# Patient Record
Sex: MALE | Race: WHITE | NOT HISPANIC OR LATINO | Employment: FULL TIME | URBAN - METROPOLITAN AREA
[De-identification: names, ages, dates, MRNs, and addresses within clinical notes are randomized per-mention and may not be internally consistent; named-entity substitution may affect disease eponyms.]

---

## 2017-02-23 ENCOUNTER — OFFICE VISIT (OUTPATIENT)
Dept: INTERNAL MEDICINE | Facility: CLINIC | Age: 22
End: 2017-02-23

## 2017-02-23 VITALS
HEART RATE: 72 BPM | BODY MASS INDEX: 31.52 KG/M2 | DIASTOLIC BLOOD PRESSURE: 80 MMHG | SYSTOLIC BLOOD PRESSURE: 120 MMHG | HEIGHT: 67 IN | WEIGHT: 200.8 LBS

## 2017-02-23 DIAGNOSIS — J02.9 ACUTE PHARYNGITIS, UNSPECIFIED ETIOLOGY: Primary | ICD-10-CM

## 2017-02-23 PROCEDURE — 99212 OFFICE O/P EST SF 10 MIN: CPT | Performed by: INTERNAL MEDICINE

## 2017-02-23 RX ORDER — AZITHROMYCIN 250 MG/1
TABLET, FILM COATED ORAL
Qty: 6 TABLET | Refills: 0 | Status: SHIPPED | OUTPATIENT
Start: 2017-02-23 | End: 2017-09-13

## 2017-02-23 NOTE — PROGRESS NOTES
Subjective   Scotty Atwood Jr. is a 21 y.o. male.     History of Present Illness he relates a 3 day history of sore throat.  She denies any rhinorrhea, fever, cough, nausea or vomiting, or diarrhea.  He has had strep throat 3 times in the past.  Unfortunately he has sensitivity to amoxicillin.        Review of Systems   Constitutional: Negative for activity change, appetite change, chills, diaphoresis, fatigue and fever.   Respiratory: Negative for cough, chest tightness, shortness of breath and wheezing.    Cardiovascular: Negative for chest pain.   Gastrointestinal: Negative for abdominal pain, diarrhea, nausea and vomiting.   Musculoskeletal: Negative for arthralgias and myalgias.   Neurological: Negative for dizziness, weakness and headaches.       Objective   Physical Exam   Constitutional: He is oriented to person, place, and time. Vital signs are normal. He appears well-developed and well-nourished. He is active. He does not appear ill.   HENT:   Right Ear: Tympanic membrane, external ear and ear canal normal.   Left Ear: Tympanic membrane, external ear and ear canal normal.   Mouth/Throat: Posterior oropharyngeal erythema present. No oropharyngeal exudate or posterior oropharyngeal edema.   Eyes: Conjunctivae are normal.   Cardiovascular: Normal rate, regular rhythm, S1 normal and S2 normal.  Exam reveals no S3 and no S4.    No murmur heard.  Pulmonary/Chest: No tachypnea. No respiratory distress. He has no decreased breath sounds. He has no wheezes. He has no rhonchi. He has no rales.   Abdominal: Soft. Normal appearance and bowel sounds are normal. He exhibits no abdominal bruit and no mass. There is no hepatosplenomegaly. There is no tenderness.   Lymphadenopathy:     He has no cervical adenopathy.   Neurological: He is alert and oriented to person, place, and time. Gait normal.   Psychiatric: He has a normal mood and affect. His speech is normal and behavior is normal. Judgment and thought content normal.  Cognition and memory are normal.       Assessment/Plan  assessment #1 acute pharyngitis-question strep and certainly he has a past history.    Plan #1 Z-Jeronimo 5 day empirically pending strep culture results.

## 2017-07-19 ENCOUNTER — APPOINTMENT (OUTPATIENT)
Dept: GENERAL RADIOLOGY | Facility: HOSPITAL | Age: 22
End: 2017-07-19

## 2017-07-19 ENCOUNTER — HOSPITAL ENCOUNTER (EMERGENCY)
Facility: HOSPITAL | Age: 22
Discharge: HOME | End: 2017-07-19
Attending: EMERGENCY MEDICINE | Admitting: EMERGENCY MEDICINE

## 2017-07-19 VITALS
WEIGHT: 195 LBS | RESPIRATION RATE: 16 BRPM | HEART RATE: 91 BPM | DIASTOLIC BLOOD PRESSURE: 79 MMHG | HEIGHT: 67 IN | BODY MASS INDEX: 30.61 KG/M2 | SYSTOLIC BLOOD PRESSURE: 128 MMHG | OXYGEN SATURATION: 97 % | TEMPERATURE: 97.8 F
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
## 2017-07-19 DIAGNOSIS — S16.1XXA CERVICAL STRAIN, INITIAL ENCOUNTER: ICD-10-CM

## 2017-07-19 DIAGNOSIS — S39.012A LUMBAR STRAIN, INITIAL ENCOUNTER: ICD-10-CM

## 2017-07-19 PROCEDURE — 99283 EMERGENCY DEPT VISIT LOW MDM: CPT

## 2017-07-19 PROCEDURE — 72110 X-RAY EXAM L-2 SPINE 4/>VWS: CPT

## 2017-07-19 PROCEDURE — 72050 X-RAY EXAM NECK SPINE 4/5VWS: CPT

## 2017-07-19 RX ORDER — CYCLOBENZAPRINE HCL 10 MG
10 TABLET ORAL 3 TIMES DAILY PRN
Qty: 20 TABLET | Refills: 0 | Status: SHIPPED | OUTPATIENT
Start: 2017-07-19 | End: 2017-09-13

## 2017-07-19 RX ORDER — DICLOFENAC SODIUM 75 MG/1
75 TABLET, DELAYED RELEASE ORAL 2 TIMES DAILY PRN
Qty: 20 TABLET | Refills: 0 | Status: SHIPPED | OUTPATIENT
Start: 2017-07-19 | End: 2017-09-13
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
## 2017-07-20 ENCOUNTER — TELEPHONE (OUTPATIENT)
Dept: SOCIAL WORK | Facility: HOSPITAL | Age: 22
End: 2017-07-20
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
## 2017-09-13 ENCOUNTER — OFFICE VISIT (OUTPATIENT)
Dept: INTERNAL MEDICINE | Facility: CLINIC | Age: 22
End: 2017-09-13

## 2017-09-13 VITALS
HEART RATE: 84 BPM | BODY MASS INDEX: 30.92 KG/M2 | DIASTOLIC BLOOD PRESSURE: 78 MMHG | SYSTOLIC BLOOD PRESSURE: 120 MMHG | HEIGHT: 67 IN | WEIGHT: 197 LBS

## 2017-09-13 DIAGNOSIS — G47.33 OBSTRUCTIVE SLEEP APNEA SYNDROME: ICD-10-CM

## 2017-09-13 DIAGNOSIS — E01.0 THYROMEGALY: Primary | ICD-10-CM

## 2017-09-13 DIAGNOSIS — Z00.00 WELLNESS EXAMINATION: ICD-10-CM

## 2017-09-13 PROBLEM — J02.9 ACUTE PHARYNGITIS: Status: RESOLVED | Noted: 2017-02-23 | Resolved: 2017-09-13

## 2017-09-13 PROBLEM — G47.30 SLEEP APNEA: Status: ACTIVE | Noted: 2017-09-13

## 2017-09-13 PROCEDURE — 99395 PREV VISIT EST AGE 18-39: CPT | Performed by: INTERNAL MEDICINE

## 2017-09-13 NOTE — PROGRESS NOTES
Subjective   Scotty Atwood Jr. is a 22 y.o. male.     History of Present Illness he is here today for physical exam which includes evaluation and treatment of snoring and daytime hypersomnolence as well as 20 pound weight gain over the last year.  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  He relates a 20 pound weight gain over the last year.  He does have significant snoring as well as daytime hypersomnolence noted for at least a few years.  He denies any dizziness or headache and he has not had any syncopal episodes.  He denies any PND, BRIONES, chest pain, wheezing, or cough.  His bowel habit is stable.  He denies any constipation or diarrhea.  In fact his review systems is otherwise negative.        Review of Systems   Constitutional: Positive for unexpected weight change. Negative for activity change, appetite change and fatigue.   HENT: Negative for trouble swallowing.    Respiratory: Negative for cough, chest tightness, shortness of breath and wheezing.    Cardiovascular: Negative for chest pain, palpitations and leg swelling.   Gastrointestinal: Negative for abdominal pain, anal bleeding, blood in stool, constipation, diarrhea, nausea and vomiting.   Genitourinary: Negative for difficulty urinating, dysuria, flank pain, frequency and hematuria.   Musculoskeletal: Negative for arthralgias and back pain.   Neurological: Negative for dizziness, syncope, facial asymmetry, speech difficulty, weakness, numbness and headaches.   Psychiatric/Behavioral: Negative for confusion and dysphoric mood. The patient is not nervous/anxious.        Objective   Physical Exam   Constitutional: He is oriented to person, place, and time. Vital signs are normal. He appears well-developed and well-nourished. He is active. He does not appear ill.   HENT:   Mouth/Throat: No oral lesions.   Eyes: Conjunctivae are normal.   Fundoscopic exam:       The right eye shows no AV nicking, no exudate  and no hemorrhage.        The left eye shows no AV nicking, no exudate and no hemorrhage.   Neck: Carotid bruit is not present. Thyromegaly present. No thyroid mass present.       Cardiovascular: Normal rate, regular rhythm, S1 normal and S2 normal.  Exam reveals no S3 and no S4.    No murmur heard.  Pulmonary/Chest: No tachypnea. No respiratory distress. He has no decreased breath sounds. He has no wheezes. He has no rhonchi. He has no rales.   Abdominal: Soft. Normal appearance and bowel sounds are normal. He exhibits no abdominal bruit and no mass. There is no hepatosplenomegaly. There is no tenderness.       Vascular Status -  His exam exhibits no right foot edema. His exam exhibits no left foot edema.  Lymphadenopathy:     He has no cervical adenopathy.     He has no axillary adenopathy.   Neurological: He is alert and oriented to person, place, and time. He displays no tremor. Gait normal.   Reflex Scores:       Bicep reflexes are 2+ on the right side.  Psychiatric: He has a normal mood and affect. His speech is normal and behavior is normal. Judgment and thought content normal. Cognition and memory are normal.       Assessment/Plan assessment #1 snoring and daytime hypersomnolence-almost assuredly obstructive sleep apnea and this was discussed with the patient.  #2 thyromegaly-euthyroid clinically    Plan #1 pulmonary consult for sleep study #2 thyroid ultrasound #3 CBC, CMP, urinalysis, TSH, lipids.  Routine follow-up with me in 6 months.

## 2017-09-21 ENCOUNTER — APPOINTMENT (OUTPATIENT)
Dept: ULTRASOUND IMAGING | Facility: HOSPITAL | Age: 22
End: 2017-09-21
Attending: INTERNAL MEDICINE

## 2017-09-25 ENCOUNTER — LAB (OUTPATIENT)
Dept: INTERNAL MEDICINE | Facility: CLINIC | Age: 22
End: 2017-09-25

## 2017-09-25 DIAGNOSIS — E01.0 THYROMEGALY: ICD-10-CM

## 2017-09-25 LAB
ALBUMIN SERPL-MCNC: 4.2 G/DL (ref 3.5–5.2)
ALBUMIN/GLOB SERPL: 1.2 G/DL
ALP SERPL-CCNC: 85 U/L (ref 39–117)
ALT SERPL W P-5'-P-CCNC: 24 U/L (ref 1–41)
ANION GAP SERPL CALCULATED.3IONS-SCNC: 9.7 MMOL/L
AST SERPL-CCNC: 23 U/L (ref 1–40)
BASOPHILS # BLD AUTO: 0.03 10*3/MM3 (ref 0–0.2)
BASOPHILS NFR BLD AUTO: 0.4 % (ref 0–2)
BILIRUB SERPL-MCNC: 0.5 MG/DL (ref 0.1–1.2)
BILIRUB UR QL STRIP: NEGATIVE
BUN BLD-MCNC: 12 MG/DL (ref 6–20)
BUN/CREAT SERPL: 12.1 (ref 7–25)
CALCIUM SPEC-SCNC: 10.2 MG/DL (ref 8.6–10.5)
CHLORIDE SERPL-SCNC: 99 MMOL/L (ref 98–107)
CHOLEST SERPL-MCNC: 225 MG/DL (ref 0–200)
CLARITY UR: CLEAR
CO2 SERPL-SCNC: 31.3 MMOL/L (ref 22–29)
COLOR UR: YELLOW
CREAT BLD-MCNC: 0.99 MG/DL (ref 0.76–1.27)
DEPRECATED RDW RBC AUTO: 39.6 FL (ref 37–54)
EOSINOPHIL # BLD AUTO: 0.1 10*3/MM3 (ref 0–0.7)
EOSINOPHIL NFR BLD AUTO: 1.3 % (ref 0–5)
ERYTHROCYTE [DISTWIDTH] IN BLOOD BY AUTOMATED COUNT: 12.5 % (ref 11.5–15)
GFR SERPL CREATININE-BSD FRML MDRD: 95 ML/MIN/1.73
GLOBULIN UR ELPH-MCNC: 3.5 GM/DL
GLUCOSE BLD-MCNC: 100 MG/DL (ref 65–99)
GLUCOSE UR STRIP-MCNC: NEGATIVE MG/DL
HCT VFR BLD AUTO: 45.7 % (ref 40.1–51)
HDLC SERPL-MCNC: 37 MG/DL (ref 40–60)
HGB BLD-MCNC: 15.6 G/DL (ref 13.7–17.5)
HGB UR QL STRIP.AUTO: NEGATIVE
KETONES UR QL STRIP: NEGATIVE
LDLC SERPL CALC-MCNC: 143 MG/DL (ref 0–100)
LDLC/HDLC SERPL: 3.88 {RATIO}
LEUKOCYTE ESTERASE UR QL STRIP.AUTO: NEGATIVE
LYMPHOCYTES # BLD AUTO: 1.95 10*3/MM3 (ref 0.8–7)
LYMPHOCYTES NFR BLD AUTO: 24.7 % (ref 10–60)
MCH RBC QN AUTO: 29.8 PG (ref 26–34)
MCHC RBC AUTO-ENTMCNC: 34.1 G/DL (ref 31–37)
MCV RBC AUTO: 87.4 FL (ref 80–100)
MONOCYTES # BLD AUTO: 0.56 10*3/MM3 (ref 0–1)
MONOCYTES NFR BLD AUTO: 7.1 % (ref 0–13)
NEUTROPHILS # BLD AUTO: 5.24 10*3/MM3 (ref 1–11)
NEUTROPHILS NFR BLD AUTO: 66.5 % (ref 30–85)
NITRITE UR QL STRIP: NEGATIVE
PH UR STRIP.AUTO: 7.5 [PH] (ref 5–8)
PLATELET # BLD AUTO: 255 10*3/MM3 (ref 150–450)
PMV BLD AUTO: 9.6 FL (ref 6–12)
POTASSIUM BLD-SCNC: 4.9 MMOL/L (ref 3.5–5.2)
PROT SERPL-MCNC: 7.7 G/DL (ref 6–8.5)
PROT UR QL STRIP: NEGATIVE
RBC # BLD AUTO: 5.23 10*6/MM3 (ref 4.63–6.08)
SODIUM BLD-SCNC: 140 MMOL/L (ref 136–145)
SP GR UR STRIP: 1.01 (ref 1–1.03)
TRIGL SERPL-MCNC: 223 MG/DL (ref 0–150)
TSH SERPL DL<=0.05 MIU/L-ACNC: 2.36 MIU/ML (ref 0.27–4.2)
UROBILINOGEN UR QL STRIP: NORMAL
VLDLC SERPL-MCNC: 44.6 MG/DL (ref 5–40)
WBC NRBC COR # BLD: 7.88 10*3/MM3 (ref 5–10)

## 2017-09-25 PROCEDURE — 85025 COMPLETE CBC W/AUTO DIFF WBC: CPT | Performed by: INTERNAL MEDICINE

## 2017-09-25 PROCEDURE — 81003 URINALYSIS AUTO W/O SCOPE: CPT | Performed by: INTERNAL MEDICINE

## 2017-09-25 PROCEDURE — 80053 COMPREHEN METABOLIC PANEL: CPT | Performed by: INTERNAL MEDICINE

## 2017-09-25 PROCEDURE — 84443 ASSAY THYROID STIM HORMONE: CPT | Performed by: INTERNAL MEDICINE

## 2017-09-25 PROCEDURE — 80061 LIPID PANEL: CPT | Performed by: INTERNAL MEDICINE

## 2017-09-25 PROCEDURE — 36415 COLL VENOUS BLD VENIPUNCTURE: CPT | Performed by: INTERNAL MEDICINE

## 2017-09-28 ENCOUNTER — HOSPITAL ENCOUNTER (OUTPATIENT)
Dept: ULTRASOUND IMAGING | Facility: HOSPITAL | Age: 22
Discharge: HOME | End: 2017-09-28
Attending: INTERNAL MEDICINE | Admitting: INTERNAL MEDICINE

## 2017-09-28 DIAGNOSIS — E01.0 THYROMEGALY: ICD-10-CM

## 2017-09-28 PROCEDURE — 76536 US EXAM OF HEAD AND NECK: CPT

## 2018-03-16 ENCOUNTER — OFFICE VISIT (OUTPATIENT)
Dept: INTERNAL MEDICINE | Facility: CLINIC | Age: 23
End: 2018-03-16

## 2018-03-16 VITALS
BODY MASS INDEX: 31.86 KG/M2 | SYSTOLIC BLOOD PRESSURE: 138 MMHG | WEIGHT: 203 LBS | HEIGHT: 67 IN | RESPIRATION RATE: 14 BRPM | DIASTOLIC BLOOD PRESSURE: 84 MMHG

## 2018-03-16 DIAGNOSIS — E04.2 MULTINODULAR GOITER: ICD-10-CM

## 2018-03-16 DIAGNOSIS — G47.33 OBSTRUCTIVE SLEEP APNEA SYNDROME: Primary | ICD-10-CM

## 2018-03-16 PROBLEM — Z00.00 WELLNESS EXAMINATION: Status: RESOLVED | Noted: 2017-09-13 | Resolved: 2018-03-16

## 2018-03-16 PROBLEM — E01.0 THYROMEGALY: Status: RESOLVED | Noted: 2017-09-13 | Resolved: 2018-03-16

## 2018-03-16 PROCEDURE — 99213 OFFICE O/P EST LOW 20 MIN: CPT | Performed by: INTERNAL MEDICINE

## 2018-03-16 NOTE — PROGRESS NOTES
Subjective   Scotty Atwood Jr. is a 22 y.o. male.     History of Present Illness he is here today for follow-up of hypercholesterolemia, presumed obstructive sleep apnea, and multinodular goiter.  He was told that his in home sleep study was normal but that the symptoms were still suggestive of obstructive sleep apnea and that he would need an in-hospital study.  That has not been done yet.  Regarding hypercholesterolemia his diet is not high in cholesterol.  Family history is negative for coronary artery disease.  He does not smoke.  Regarding his thyroid there has been no tenderness and he denies any dysphagia.  He does have fatigue.        Review of Systems   Constitutional: Positive for fatigue. Negative for activity change and appetite change.   Respiratory: Negative for cough, chest tightness, shortness of breath and wheezing.    Cardiovascular: Negative for chest pain, palpitations and leg swelling.   Gastrointestinal: Negative for abdominal pain.   Genitourinary: Negative for dysuria, flank pain, frequency and hematuria.   Neurological: Negative for dizziness and headaches.       Objective   Physical Exam   Constitutional: He is oriented to person, place, and time. Vital signs are normal. He appears well-developed and well-nourished. He is active. He does not appear ill.   Eyes: Conjunctivae are normal.   Neck: Thyromegaly present. No thyroid mass present.       Cardiovascular: Normal rate, regular rhythm, S1 normal and S2 normal.  Exam reveals no S3 and no S4.    No murmur heard.  Pulmonary/Chest: No tachypnea. No respiratory distress. He has no decreased breath sounds. He has no wheezes. He has no rhonchi. He has no rales.   Abdominal: Soft. Normal appearance and bowel sounds are normal. He exhibits no abdominal bruit and no mass. There is no hepatosplenomegaly. There is no tenderness.     Vascular Status -  His right foot exhibits normal right foot edema. His left foot exhibits normal left foot  edema.  Neurological: He is alert and oriented to person, place, and time. Gait normal.   Psychiatric: He has a normal mood and affect. His speech is normal and behavior is normal. Judgment and thought content normal. Cognition and memory are normal.         Assessment/Plan temper lab showed normal CBC, CMP, urinalysis, TSH.  Total cholesterol 225 triglycerides 283 HDL cholesterol 37 LDL cholesterol 143.  Thyroid ultrasound showed a multinodular gland without dominant nodules.    Assessment #1 suspect obstructive sleep [id number] with pulmonary necessary and this was discussed with him.  #2 multinodular goiter-euthyroid at present #3 mild hypercholesterolemia-at this point long-term statin therapy is not indicated and this was discussed with him as well.    Plan #1 schedule pulmonary follow-up.  Routine follow-up in 6 months with repeat thyroid ultrasound at that time.

## 2018-04-04 ENCOUNTER — APPOINTMENT (OUTPATIENT)
Dept: SLEEP MEDICINE | Facility: HOSPITAL | Age: 23
End: 2018-04-04

## 2018-04-05 ENCOUNTER — APPOINTMENT (OUTPATIENT)
Dept: SLEEP MEDICINE | Facility: HOSPITAL | Age: 23
End: 2018-04-05

## 2019-09-14 ENCOUNTER — HOSPITAL ENCOUNTER (EMERGENCY)
Facility: HOSPITAL | Age: 24
Discharge: HOME | End: 2019-09-14
Attending: EMERGENCY MEDICINE | Admitting: EMERGENCY MEDICINE

## 2019-09-14 VITALS
SYSTOLIC BLOOD PRESSURE: 109 MMHG | OXYGEN SATURATION: 97 % | DIASTOLIC BLOOD PRESSURE: 80 MMHG | HEIGHT: 67 IN | BODY MASS INDEX: 31.39 KG/M2 | WEIGHT: 200 LBS | TEMPERATURE: 98.8 F | HEART RATE: 78 BPM | RESPIRATION RATE: 16 BRPM

## 2019-09-14 DIAGNOSIS — S61.210A LACERATION OF RIGHT INDEX FINGER WITHOUT FOREIGN BODY WITHOUT DAMAGE TO NAIL, INITIAL ENCOUNTER: Primary | ICD-10-CM

## 2019-09-14 PROCEDURE — 99282 EMERGENCY DEPT VISIT SF MDM: CPT

## 2019-09-14 RX ORDER — LIDOCAINE HYDROCHLORIDE AND EPINEPHRINE 10; 10 MG/ML; UG/ML
10 INJECTION, SOLUTION INFILTRATION; PERINEURAL ONCE
Status: COMPLETED | OUTPATIENT
Start: 2019-09-14 | End: 2019-09-14

## 2019-09-14 RX ADMIN — LIDOCAINE HYDROCHLORIDE AND EPINEPHRINE 10 ML: 10; 10 INJECTION, SOLUTION INFILTRATION; PERINEURAL at 14:25

## 2019-09-14 NOTE — DISCHARGE INSTRUCTIONS
1.  Keep initial dressing in place for 24 hours if able.  (if blood seeps through, then remove this dressing, wash gently with antibacterial soap and pat dry)    2.  After initial 24 hours wash the wound twice a day with antibacterial soap.  Sutures will dissolve on their own.    3.  Cover with bandaid while at work    Return Precautions    Although you are being discharged from the ED today, I encourage you to return for worsening symptoms.  Things can, and do, change such that treatment at home with medication may not be adequate.      Specifically, return for any of the following:    Chest pain, shortness of breath, pain or nausea and vomiting not controlled by medications provided.    Please make a follow up with your Primary Care Provider for a blood pressure recheck.

## 2019-09-14 NOTE — ED PROVIDER NOTES
EMERGENCY DEPARTMENT ENCOUNTER    Room Number:  06/06  Date seen:  9/14/2019  Time seen: 12:42 PM  PCP: Marko Xie Jr., MD (Inactive)    HPI:  Chief complaint: finger laceration  Context:Scotty Atwood Jr. is a 24 y.o. male who presents to the ED with c/o R index finger laceration, occurring after pt cut his hand on a broken glass cup while washing the dishes this morning, half an hour ago. Bleeding has been stopped w/ direct pressure and tissue over the wound. No other complaints at this time. Up to date on Tetanus shot.    ALLERGIES  Amoxicillin    PAST MEDICAL HISTORY  Active Ambulatory Problems     Diagnosis Date Noted   • Environmental allergies 05/11/2016   • Mild intermittent asthma without complication 05/11/2016   • Sleep apnea 09/13/2017   • Multinodular goiter 03/16/2018     Resolved Ambulatory Problems     Diagnosis Date Noted   • Onychomycosis 05/11/2016   • Acute pharyngitis 02/23/2017   • Thyromegaly 09/13/2017   • Wellness examination 09/13/2017     Past Medical History:   Diagnosis Date   • Seasonal allergies        PAST SURGICAL HISTORY  Past Surgical History:   Procedure Laterality Date   • APPENDECTOMY         FAMILY HISTORY  Family History   Problem Relation Age of Onset   • Diabetes Father    • Cancer Paternal Grandmother        SOCIAL HISTORY  Social History     Socioeconomic History   • Marital status: Single     Spouse name: Not on file   • Number of children: Not on file   • Years of education: Not on file   • Highest education level: Not on file   Tobacco Use   • Smoking status: Never Smoker   Substance and Sexual Activity   • Alcohol use: No   • Drug use: No   • Sexual activity: Defer       REVIEW OF SYSTEMS  Review of Systems   Constitutional: Negative for activity change, appetite change, diaphoresis and fever.   HENT: Negative for trouble swallowing.    Eyes: Negative for visual disturbance.   Respiratory: Negative for cough, chest tightness, shortness of breath and wheezing.     Cardiovascular: Negative for chest pain, palpitations and leg swelling.   Gastrointestinal: Negative for abdominal pain, diarrhea, nausea and vomiting.   Genitourinary: Negative for dysuria.   Musculoskeletal: Negative for back pain.   Skin: Positive for wound (+) RIF laceration. Negative for rash.   Neurological: Negative for dizziness, speech difficulty and light-headedness.       PHYSICAL EXAM  ED Triage Vitals [09/14/19 1231]   Temp Heart Rate Resp BP SpO2   98.8 °F (37.1 °C) 85 17 -- 98 %      Temp src Heart Rate Source Patient Position BP Location FiO2 (%)   Tympanic -- -- -- --     Physical Exam   Constitutional: He is oriented to person, place, and time and well-developed, well-nourished, and in no distress. No distress.   HENT:   Head: Normocephalic and atraumatic.   Eyes: Pupils are equal, round, and reactive to light.   Neck: Normal range of motion. Neck supple.   Cardiovascular: Normal rate, regular rhythm, S1 normal, S2 normal and normal heart sounds. Exam reveals no gallop and no friction rub.   No murmur heard.  Pulmonary/Chest: Effort normal and breath sounds normal. No respiratory distress. He has no decreased breath sounds. He has no wheezes. He has no rales. He exhibits no tenderness.   Abdominal: Soft.   Musculoskeletal: Normal range of motion. He exhibits no deformity.        Hands:  Lymphadenopathy:     He has no cervical adenopathy.   Neurological: He is alert and oriented to person, place, and time.   Skin: Skin is warm and dry.   2 cm Z shaped laceration to RIF   Psychiatric: Affect normal.   Nursing note and vitals reviewed.    MEDICATIONS GIVEN IN ER  Medications   lidocaine-EPINEPHrine (XYLOCAINE W/EPI) 1 %-1:[id number] injection 10 mL (10 mL Injection Given by Other 9/14/19 1425)       PROCEDURES  Laceration Repair  Date/Time: 9/14/2019 1:40 PM  Performed by: Radha Flores APRN  Authorized by: Adams Delgado MD     Consent:     Consent obtained:  Verbal    Consent given by:   Patient    Risks discussed:  Infection, need for additional repair and pain  Anesthesia (see MAR for exact dosages):     Anesthesia method:  Local infiltration    Local anesthetic:  Lidocaine 1% WITH epi  Laceration details:     Location:  Finger    Finger location:  R index finger    Length (cm):  2  Repair type:     Repair type:  Simple  Exploration:     Hemostasis achieved with:  Direct pressure  Treatment:     Area cleansed with:  Mayco    Amount of cleaning:  Extensive    Irrigation solution:  Sterile saline    Irrigation method:  Pressure wash  Skin repair:     Repair method:  Sutures    Suture size:  5-0    Suture material:  Chromic gut    Suture technique:  Simple interrupted    Number of sutures:  9  Approximation:     Approximation:  Close  Post-procedure details:     Dressing:  Splint for protection    Patient tolerance of procedure:  Tolerated well, no immediate complications                COURSE & MEDICAL DECISION MAKING  Pertinent Labs and Imaging studies that were ordered and reviewed are noted above.  Results were reviewed/discussed with the patient and they were also made aware of online access.  Pt also made aware that some labs, such as cultures, will not be resulted during ER visit and follow up with PMD is necessary.     PROGRESS AND CONSULTS    Progress Notes:     1256- Discussed plan to repair laceration. Pt understands and agrees with the plan, all questions answered.    1315- Reviewed pt's history and workup with Dr. Delgado.  After a bedside evaluation, Dr. Delgado agrees with the plan of care.    1341- Reentered pt's room to repair laceration. /90. Pt in NAD. Discussed the plan to discharge pt home, w/ instructions to f/u w/ PCP for follow up. Pt understands and agrees with the plan, all questions answered.    The patient's history, physical exam, and lab findings were discussed with the physician, who also performed a face to face history and physical exam.  I discussed all  "results and noted any abnormalities with patient.  Discussed absoute need to recheck abnormalities with their family physician.  I answered any of the patient's questions.  Discussed plan for discharge, as there is no emergent indication for admission.  Pt is agreeable and understands need for follow up and repeat testing.  Pt is aware that discharge does not mean that nothing is wrong but it indicates no emergency is present and they must continue care with their family physician.  Pt is discharged with instructions to follow up with primary care doctor to have their blood pressure rechecked.       Disposition vitals:  /80   Pulse 78   Temp 98.8 °F (37.1 °C) (Tympanic)   Resp 16   Ht 170.2 cm (67\")   Wt 90.7 kg (200 lb)   SpO2 97%   BMI 31.32 kg/m²       DIAGNOSIS  Final diagnoses:   Laceration of right index finger without foreign body without damage to nail, initial encounter       FOLLOW UP   Anne-MarieMarko padilla Jr., MD  [address] [address]  [phone]    Schedule an appointment as soon as possible for a visit in 1 week  For wound re-check      RX     Medication List      No changes were made to your prescriptions during this visit.       Documentation assistance provided by ovidio Wise for MODESTA Robins.  Information recorded by the ovidio was done at my direction and has been verified and validated by me.     Lia Wise  09/14/19 [id number]       Radha Flores APRN  09/14/19 9480    "

## 2019-09-14 NOTE — ED NOTES
Wound dressed with non-adherent dressing and placed in finger splint.      Sebastian Looney RN  09/14/19 4040

## 2019-09-14 NOTE — ED TRIAGE NOTES
Pt cut hand on glass cup, laceration noted to right index finger, bleeding controlled. Unknown last tetanus shot.

## 2019-09-14 NOTE — ED PROVIDER NOTES
Pt presents to the ED c/o a laceration to his right index finger that occurred while he was washing a glass cup and it shattered as he was cleaning it. Pt does not recall the date of his last tetanus shot, but he believes he is up to date.     On exam,   3 cm laceraton on the dorsum of his rght second finger w/o tendon involvement. Intact flexion and extension at all MCP, PIP, and DIP joints.     Plan: MODESTA Robins, will perform laceration repair and will discharge home.     Attestation:  The YESSI and I have discussed this patient's history, physical exam, and treatment plan.  I have reviewed the documentation and personally had a face to face interaction with the patient. I affirm the documentation and agree with the treatment and plan.  The attached note describes my personal findings.       Documentation assistance provided by ovidio Sanabria for Dr. CHIRAG Delgado MD.  Information recorded by the scribe was done at my direction and has been verified and validated by me.       Ana Paula Sanabria  09/14/19 4647       Adams Delgado MD  09/14/19 3468

## 2019-09-24 ENCOUNTER — OFFICE VISIT (OUTPATIENT)
Dept: INTERNAL MEDICINE | Facility: CLINIC | Age: 24
End: 2019-09-24

## 2019-09-24 VITALS
BODY MASS INDEX: 31.58 KG/M2 | HEIGHT: 68 IN | OXYGEN SATURATION: 98 % | DIASTOLIC BLOOD PRESSURE: 74 MMHG | HEART RATE: 67 BPM | TEMPERATURE: 97.8 F | RESPIRATION RATE: 16 BRPM | WEIGHT: 208.4 LBS | SYSTOLIC BLOOD PRESSURE: 106 MMHG

## 2019-09-24 DIAGNOSIS — J45.20 MILD INTERMITTENT ASTHMA WITHOUT COMPLICATION: Primary | ICD-10-CM

## 2019-09-24 DIAGNOSIS — E04.2 MULTINODULAR GOITER: ICD-10-CM

## 2019-09-24 DIAGNOSIS — B35.1 ONYCHOMYCOSIS: ICD-10-CM

## 2019-09-24 DIAGNOSIS — Z00.00 HEALTHCARE MAINTENANCE: ICD-10-CM

## 2019-09-24 DIAGNOSIS — Z91.09 ENVIRONMENTAL ALLERGIES: ICD-10-CM

## 2019-09-24 PROCEDURE — 99214 OFFICE O/P EST MOD 30 MIN: CPT | Performed by: NURSE PRACTITIONER

## 2019-09-24 RX ORDER — ALBUTEROL SULFATE 90 UG/1
2 AEROSOL, METERED RESPIRATORY (INHALATION) EVERY 4 HOURS PRN
Qty: 1 INHALER | Refills: 2 | Status: SHIPPED | OUTPATIENT
Start: 2019-09-24

## 2019-09-24 RX ORDER — TERBINAFINE HYDROCHLORIDE 250 MG/1
250 TABLET ORAL DAILY
Qty: 45 TABLET | Refills: 0 | Status: SHIPPED | OUTPATIENT
Start: 2019-09-24

## 2019-09-24 NOTE — PROGRESS NOTES
"Subjective   Scotty Atwood Jr. is a 24 y.o. male.   CC: Establish care, foot fungus    Patient presents to establish care.  This is a 24-year-old male former patient of Dr. Xie.  This patient is new to me.    He has a history of multinodular goiter.  He had an ultrasound of the thyroid in 2017.  He reports no changes to his thyroid recently to his knowledge.    He has a history of mild intermittent asthma.  He reports that he has had to use an albuterol inhaler in the past very mildly.  He has not used one in quite some time.  He reports that he does have seasonal allergies for which he takes Claritin-D from time to time which controls the condition well.  He reports no increasing shortness of breath, chest tightness or chest discomfort.    He denies any alcohol use, illicit drug use or tobacco use.  He reports that he remains active and eats a generally healthy diet.    He reports that he has struggled off and on with \"foot issues.\"  He reports that his previous PCP sent him to a podiatrist in the past and he was treated for athlete's foot.  He reports that the condition returned, and for the past several months he has been trying to treat it with over-the-counter topical agents including athlete's foot cream and peroxide.  He reports that no matter what he does, the condition does not fully go away.  He endorses some itching between his toes when the condition is flared up.    He denies development of any other new issues today.         The following portions of the patient's history were reviewed and updated as appropriate: allergies, current medications, past family history, past medical history, past social history, past surgical history and problem list.    Review of Systems   Constitutional: Negative for activity change, chills, fatigue, fever, unexpected weight gain and unexpected weight loss.   HENT: Negative for congestion, hearing loss, postnasal drip, sinus pressure, sneezing, sore throat, swollen " "glands and tinnitus.    Eyes: Negative for photophobia, pain and visual disturbance.   Respiratory: Negative for cough, chest tightness, shortness of breath and wheezing.    Cardiovascular: Negative for chest pain, palpitations and leg swelling.   Gastrointestinal: Negative for abdominal distention, abdominal pain, constipation, diarrhea, nausea and vomiting.   Endocrine: Negative for polydipsia, polyphagia and polyuria.   Genitourinary: Negative for dysuria, frequency, hematuria and urgency.   Skin:        Athlete's foot to bilateral feet   Neurological: Negative for dizziness, weakness, numbness and headache.   All other systems reviewed and are negative.      Objective    /74 (BP Location: Left arm, Patient Position: Sitting, Cuff Size: Large Adult)   Pulse 67   Temp 97.8 °F (36.6 °C) (Oral)   Resp 16   Ht 171.5 cm (67.5\")   Wt 94.5 kg (208 lb 6.4 oz)   SpO2 98%   BMI 32.16 kg/m²     Physical Exam   Constitutional: He is oriented to person, place, and time. He appears well-developed and well-nourished. No distress.   HENT:   Head: Normocephalic and atraumatic.   Right Ear: External ear normal.   Left Ear: External ear normal.   Nose: Nose normal.   Mouth/Throat: Oropharynx is clear and moist.   Eyes: Conjunctivae and EOM are normal. Pupils are equal, round, and reactive to light.   Neck: Normal range of motion. Neck supple. No JVD present. No tracheal deviation present. Thyromegaly (  Mild) present.   Bowel sounds active x4 quadrants   Cardiovascular: Normal rate, regular rhythm, normal heart sounds and intact distal pulses. Exam reveals no gallop and no friction rub.   No murmur heard.  Posterior tib and pedal pulses 2+ and equal bilaterally.  No peripheral edema.   Pulmonary/Chest: Effort normal and breath sounds normal. No stridor. No respiratory distress. He has no wheezes. He has no rales. He exhibits no tenderness.   Lungs are CTA bilaterally   Abdominal: Soft. Bowel sounds are normal. He " exhibits no distension. There is no tenderness.   Musculoskeletal: Normal range of motion.   Lymphadenopathy:     He has no cervical adenopathy.   Neurological: He is alert and oriented to person, place, and time.   Skin: Skin is warm and dry. Capillary refill takes less than 2 seconds. He is not diaphoretic.   Flaking of bilateral toenails, skin between toes.  No erythema or drainage.   Psychiatric: He has a normal mood and affect. His behavior is normal. Judgment and thought content normal.   Nursing note and vitals reviewed.    Current outpatient and discharge medications have been reconciled for the patient.  Reviewed by: MODESTA Deras      Assessment/Plan   Diagnoses and all orders for this visit:    Mild intermittent asthma without complication  -     albuterol sulfate  (90 Base) MCG/ACT inhaler; Inhale 2 puffs Every 4 (Four) Hours As Needed for Wheezing or Shortness of Air.    Multinodular goiter  -     US Thyroid; Future  -     CBC & Differential  -     Comprehensive metabolic panel  -     Lipid panel  -     TSH  -     T4, Free    Environmental allergies    Healthcare maintenance  -     CBC & Differential  -     Comprehensive metabolic panel  -     Lipid panel    Onychomycosis  -     terbinafine (lamiSIL) 250 MG tablet; Take 1 tablet by mouth Daily.    -Mild intermittent asthma: This has not been a long-term issue for him.  He has had an albuterol inhaler on hand in the past and we will provide one today for as needed use.  He has not required its use lately.    Multinodular goiter: We will follow-up on this with an ultrasound of the thyroid as well as TSH and free T4.    Seasonal allergies: Stable with as needed Claritin-D use.  Continue current therapy.    Healthcare maintenance: We will get baseline labs including CBC, CMP and lipid panel.    Onychomycosis: We will provide terbinafine 250 mg daily x6 weeks.  He will let me know if symptoms persist or worsen despite this treatment.    We will  contact patient with results of his labs and imaging and any further recommendations.  Follow-up PRN and we will see him back in 1 year for a physical.

## 2019-09-25 LAB
ALBUMIN SERPL-MCNC: 4 G/DL (ref 3.5–5.2)
ALBUMIN/GLOB SERPL: 1.1 G/DL
ALP SERPL-CCNC: 94 U/L (ref 39–117)
ALT SERPL-CCNC: 26 U/L (ref 1–41)
AST SERPL-CCNC: 21 U/L (ref 1–40)
BASOPHILS # BLD AUTO: 0.04 10*3/MM3 (ref 0–0.2)
BASOPHILS NFR BLD AUTO: 0.7 % (ref 0–1.5)
BILIRUB SERPL-MCNC: 0.3 MG/DL (ref 0.2–1.2)
BUN SERPL-MCNC: 18 MG/DL (ref 6–20)
BUN/CREAT SERPL: 18 (ref 7–25)
CALCIUM SERPL-MCNC: 9.5 MG/DL (ref 8.6–10.5)
CHLORIDE SERPL-SCNC: 99 MMOL/L (ref 98–107)
CHOLEST SERPL-MCNC: 293 MG/DL (ref 0–200)
CO2 SERPL-SCNC: 30 MMOL/L (ref 22–29)
CREAT SERPL-MCNC: 1 MG/DL (ref 0.76–1.27)
EOSINOPHIL # BLD AUTO: 0.13 10*3/MM3 (ref 0–0.4)
EOSINOPHIL NFR BLD AUTO: 2.3 % (ref 0.3–6.2)
ERYTHROCYTE [DISTWIDTH] IN BLOOD BY AUTOMATED COUNT: 13.5 % (ref 12.3–15.4)
GLOBULIN SER CALC-MCNC: 3.5 GM/DL
GLUCOSE SERPL-MCNC: 91 MG/DL (ref 65–99)
HCT VFR BLD AUTO: 47.9 % (ref 37.5–51)
HDLC SERPL-MCNC: 36 MG/DL (ref 40–60)
HGB BLD-MCNC: 16 G/DL (ref 13–17.7)
IMM GRANULOCYTES # BLD AUTO: 0.01 10*3/MM3 (ref 0–0.05)
IMM GRANULOCYTES NFR BLD AUTO: 0.2 % (ref 0–0.5)
LDLC SERPL CALC-MCNC: ABNORMAL MG/DL
LYMPHOCYTES # BLD AUTO: 2 10*3/MM3 (ref 0.7–3.1)
LYMPHOCYTES NFR BLD AUTO: 35.7 % (ref 19.6–45.3)
MCH RBC QN AUTO: 29.3 PG (ref 26.6–33)
MCHC RBC AUTO-ENTMCNC: 33.4 G/DL (ref 31.5–35.7)
MCV RBC AUTO: 87.6 FL (ref 79–97)
MONOCYTES # BLD AUTO: 0.5 10*3/MM3 (ref 0.1–0.9)
MONOCYTES NFR BLD AUTO: 8.9 % (ref 5–12)
NEUTROPHILS # BLD AUTO: 2.93 10*3/MM3 (ref 1.7–7)
NEUTROPHILS NFR BLD AUTO: 52.2 % (ref 42.7–76)
NRBC BLD AUTO-RTO: 0 /100 WBC (ref 0–0.2)
PLATELET # BLD AUTO: 282 10*3/MM3 (ref 140–450)
POTASSIUM SERPL-SCNC: 4.4 MMOL/L (ref 3.5–5.2)
PROT SERPL-MCNC: 7.5 G/DL (ref 6–8.5)
RBC # BLD AUTO: 5.47 10*6/MM3 (ref 4.14–5.8)
SODIUM SERPL-SCNC: 140 MMOL/L (ref 136–145)
T4 FREE SERPL-MCNC: 1.09 NG/DL (ref 0.93–1.7)
TRIGL SERPL-MCNC: 668 MG/DL (ref 0–150)
TSH SERPL DL<=0.005 MIU/L-ACNC: 1.55 UIU/ML (ref 0.27–4.2)
VLDLC SERPL CALC-MCNC: ABNORMAL MG/DL
WBC # BLD AUTO: 5.61 10*3/MM3 (ref 3.4–10.8)

## 2019-09-26 DIAGNOSIS — E78.2 MIXED HYPERLIPIDEMIA: Primary | ICD-10-CM

## 2019-09-26 RX ORDER — ICOSAPENT ETHYL 1000 MG/1
2 CAPSULE ORAL 2 TIMES DAILY WITH MEALS
Qty: 120 CAPSULE | Refills: 2 | Status: SHIPPED | OUTPATIENT
Start: 2019-09-26 | End: 2019-09-30 | Stop reason: SDUPTHER

## 2019-09-27 NOTE — PROGRESS NOTES
Please notify patient that his blood count looks stable.  Metabolic panel looks stable as well.  His cholesterol panel is extremely elevated.  Total cholesterol is 293 and triglycerides are very high at 668 which is at a dangerous level.  We need to get him started on vascepa to help lower the triglycerides and I have sent them to his pharmacy.  Please decrease intake of carbs and sugars if possible as well as any fatty foods.  I would like to recheck a lipid panel in 6 months which I have ordered, please make the six-month lab appointment.  It is important that we get the cholesterol down in order to prevent cardiovascular effects.

## 2019-09-30 DIAGNOSIS — E78.2 MIXED HYPERLIPIDEMIA: ICD-10-CM

## 2019-09-30 RX ORDER — ICOSAPENT ETHYL 1000 MG/1
2 CAPSULE ORAL 2 TIMES DAILY WITH MEALS
Qty: 120 CAPSULE | Refills: 2 | Status: SHIPPED | OUTPATIENT
Start: 2019-09-30

## 2019-10-08 ENCOUNTER — HOSPITAL ENCOUNTER (OUTPATIENT)
Dept: ULTRASOUND IMAGING | Facility: HOSPITAL | Age: 24
Discharge: HOME | End: 2019-10-08
Admitting: NURSE PRACTITIONER

## 2019-10-08 DIAGNOSIS — E04.2 MULTINODULAR GOITER: ICD-10-CM

## 2019-10-08 PROCEDURE — 76536 US EXAM OF HEAD AND NECK: CPT

## 2019-10-11 ENCOUNTER — TELEPHONE (OUTPATIENT)
Dept: INTERNAL MEDICINE | Facility: CLINIC | Age: 24
End: 2019-10-11

## 2019-10-11 DIAGNOSIS — E06.9 THYROIDITIS: Primary | ICD-10-CM

## 2019-10-11 NOTE — PROGRESS NOTES
Please notify patient that his thyroid ultrasound is showing some inflammation of the thyroid but as his thyroid labs looked normal I believe this is stable, however I would like him to be evaluated by ENT regarding his thyroid.  I have entered the referral for Dr. Brower.

## 2019-10-11 NOTE — TELEPHONE ENCOUNTER
----- Message from MODESTA Deras sent at 10/11/2019  8:39 AM EDT -----  Please notify patient that his thyroid ultrasound is showing some inflammation of the thyroid but as his thyroid labs looked normal I believe this is stable, however I would like him to be evaluated by ENT regarding his thyroid.  I have entered the r  efchayoal for Dr. Brower.

## 2020-03-26 ENCOUNTER — RESULTS ENCOUNTER (OUTPATIENT)
Dept: INTERNAL MEDICINE | Facility: CLINIC | Age: 25
End: 2020-03-26

## 2020-03-26 DIAGNOSIS — E78.2 MIXED HYPERLIPIDEMIA: ICD-10-CM

## 2021-04-16 ENCOUNTER — BULK ORDERING (OUTPATIENT)
Dept: CASE MANAGEMENT | Facility: OTHER | Age: 26
End: 2021-04-16

## 2021-04-16 DIAGNOSIS — Z23 IMMUNIZATION DUE: ICD-10-CM
